# Patient Record
Sex: MALE | Race: WHITE | NOT HISPANIC OR LATINO | URBAN - METROPOLITAN AREA
[De-identification: names, ages, dates, MRNs, and addresses within clinical notes are randomized per-mention and may not be internally consistent; named-entity substitution may affect disease eponyms.]

---

## 2022-02-09 ENCOUNTER — APPOINTMENT (RX ONLY)
Dept: URBAN - METROPOLITAN AREA CLINIC 119 | Facility: CLINIC | Age: 22
Setting detail: DERMATOLOGY
End: 2022-02-09

## 2022-02-09 DIAGNOSIS — N62 HYPERTROPHY OF BREAST: ICD-10-CM

## 2022-02-09 PROCEDURE — ? ORDER ULTRASOUND

## 2022-02-09 PROCEDURE — ? COUNSELING

## 2022-02-09 PROCEDURE — 99203 OFFICE O/P NEW LOW 30 MIN: CPT

## 2022-02-09 NOTE — PROCEDURE: COUNSELING
Detail Level: Detailed
Patient Specific Counseling (Will Not Stick From Patient To Patient): Patient recommended to get ultrasound of right nipple.

## 2022-02-09 NOTE — PROCEDURE: ORDER ULTRASOUND
Priority: normal
Breast Ultrasound Reason: Gayle Askew
Provider: Marilyn Aguirre MD
Ultrasound Protocol: Ultrasound of Breast
Which Breast: Right Breast
Detail Level: Simple